# Patient Record
Sex: FEMALE | Race: WHITE | Employment: FULL TIME | URBAN - METROPOLITAN AREA
[De-identification: names, ages, dates, MRNs, and addresses within clinical notes are randomized per-mention and may not be internally consistent; named-entity substitution may affect disease eponyms.]

---

## 2017-09-05 ENCOUNTER — HOSPITAL ENCOUNTER (OUTPATIENT)
Dept: PHYSICAL THERAPY | Age: 32
Discharge: HOME | End: 2017-09-05
Payer: COMMERCIAL

## 2017-09-05 PROCEDURE — 97110 THERAPEUTIC EXERCISES: CPT

## 2017-09-05 PROCEDURE — 97161 PT EVAL LOW COMPLEX 20 MIN: CPT

## 2017-09-05 NOTE — PROGRESS NOTES
[address] [facility] AT [address], [address] [place] [address]  Phone: [phone]   [phone]  PLAN OF CARE / [address] [place] [address] PHYSICAL THERAPY SERVICES  Patient Name: Dominic Craig : 1985   Medical   Diagnosis: Chest pain [R07.9] Treatment Diagnosis: R07.9   Onset Date: 2017     Referral Source: Fiona Shape Start of Care [facility]): 2017   Prior Hospitalization: See medical history Provider #: [id number]   Prior Level of Function: R hand dominant, independent w/ ADL's   Comorbidities: NA   Medications: Verified on Patient Summary List   The Plan of Care and following information is based on the information from the initial evaluation.   ===========================================================================================  Assessment / key information:  Patient is a 29 y/o female presenting with chief c/o right sided chest discomfort several months ago, beginning as pressure in the chest (possibly related to working out). Symptoms would often be relieved after popping. Symptoms escalated 2-3 weeks ago with no known cause. Patient reports pain in the mid sternal chris of the chest right of center. Symptoms are more noticeable with stretching right shoulder back, sleeping on involved side. Symptoms range from 0-8/10. Patient is R hand dominant. 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 Patient presents with moderate forward head position and flattened thoracic kyphosis. Spine and shoulder AROM are WFL, but mid thoracic and sternal tightness are noted with end range cervical flexion and end range shoulder flexion/abduction. Pt is TTP along the R sternal intercostal spaces 4-6, pec major, mid posterior thoracic facets and rhomboid muscle. Increased tension/discomfort noted with full inhalation/exhalation as well.  The patient was instructed in a home exercise program to address the above findings/deficits. The patient should benefit from therapy services to progress toward functional goals and improve quality of life.  ===========================================================================================  History LOW Complexity : Zero comorbidities / personal factors that will impact the outcome / POC; Examination MEDIUM Complexity : 3 Standardized tests and measures addressing body structure, function, activity limitation and / or participation in recreation ; Presentation MEDIUM Complexity : Evolving with changing characteristics ; Decision Making MEDIUM Complexity : FOTO score of 26-74; Complexity LOW   Problem List: pain affecting function, decrease strength, decrease ADL/ functional abilitiies, decrease activity tolerance and decrease flexibility/ joint mobility   Treatment Plan may include any combination of the following: Therapeutic exercise, Therapeutic activities, Physical agent/modality, Manual therapy and Patient education  Patient / Family readiness to learn indicated by: asking questions, trying to perform skills and interest  Persons(s) to be included in education: patient (P)  Barriers to Learning/Limitations: None  Measures taken:    Patient Goal (s): Make pain go away   Patient self reported health status: good  Rehabilitation Potential: good   Short Term Goals: To be accomplished in  4-6  treatments: Independent/compliant with long term HEP for thoracic mobility and posture  Patient will be educated on sitting posture modification to reduce musculoskeletal strain with sitting ADL's  Demonstrate minimal to no difference in right versus left pec mobility in doorway position   Long Term Goals:  To be accomplished in  8-12  treatments:  Patient will report 50% functional improvement with lifting and reaching ADL's involving right arm  Improve FOTO outcome score by 15% to indicate a significant improvement in ADL function  Frequency / Duration:   Patient to be seen  2  times per week for 4-6  weeks:  Patient / Caregiver education and instruction: activity modification and exercises  G-Codes (GP): NA  Therapist Signature: Melodie Hernandez PT, OCS Date: 5/3/6701   Certification Period: NA Time: 1:54 PM   ===========================================================================================  I certify that the above Physical Therapy Services are being furnished while the patient is under my care. I agree with the treatment plan and certify that this therapy is necessary. Physician Signature:        Date:       Time:     Please sign and return to [facility] or you may fax the signed copy to [phone]. Thank you.

## 2017-09-05 NOTE — PROGRESS NOTES
SHOULDER EVALUATION/ DAILY TREATMENT NOTE    Patient Name: Sarah Haile  Date:2017  : 1985  [x]  Patient  Verified  Payor:  / Plan: [facility] [place]note 2[place]Winston Monroy DEPENDENTS / Product Type: Laureano Finch /    In time:1:00  Out time:1:46  Total Treatment Time (min): 46  Total Timed Codes (min): 46  Visit #: 1      Treatment Area: Chest pain [R07.9]    SUBJECTIVE HISTORY:   Patient is a 29 y/o female presenting with chief c/o right sided chest discomfort several months ago, beginning as pressure in the chest (possibly related to working out). Symptoms would often be relieved after popping. Symptoms escalated 2-3 weeks ago with no known cause. Patient reports pain in the mid sternal chris of the chest right of center. Symptoms are more noticeable with stretching right shoulder back, sleeping on involved side. Symptoms range from 0-8/10. Patient is R hand dominant. 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    Pain Level (0-10 scale): 0    Posture: Mild fwd head posture, flattened T/S kyphosis    ROM:  [] Unable to assess at this time                                           AROM                                                              PROM   Left Right  Left Right   Flexion  WNL Flexion     Extension   Extension     Abduction   Abduction     ER @ 0 Degrees   ER @ 0 Degrees     ER @ 90 Degrees   ER @ 90 Degrees     IR @ 90 Degrees   IR @ 90 Degrees     Cervical mobility: Flexion 90%- thoracic restriction  Mild restriction w/ BL combined thoracic flexion/rotation    End Feel / Painful Arc:    Strength:   [] Unable to assess at this time                                                                            L (1-5) R (1-5) Pain   Flexors 4+ 4+ [] Yes   [x] No   Abductors   [] Yes   [] No   External Rotators   [] Yes   [] No   Internal Rotators   [] Yes   [] No   Supraspinatus   [] Yes   [] No   Serratus Anterior 4 4- [] Yes   [] No   Lower Trapezius   [] Yes   [] No   Elbow Flexion   [] Yes   [] No   Elbow Extension   [] Yes   [] No   Pec activation w/ resistance/overpressure painful on R     Scapulohumoral Control / Rhythm:  Able to eccentrically lower with good control?  Left: [] Yes   [] No     Right: [] Yes   [] No  Sub scapular clicking/crepitus palpable with active scapular protraction/retraction    Palpation:  [] Min  [x] Mod  [] Severe    Location:R anterior intercostal space 4-6, pec major  [] Min  [x] Mod  [] Severe    Location:Posterior R T4-5 facets, rhomboid   Min  [] Mod  [] Severe    Location:    Special Tests:  Adson's Test  [] Pos   [] Neg Yergason's Test [] Pos   [] Neg  Jacki's Test  [] Pos   [] Neg ER Lag Sign     [] Pos   [] Neg  Neer's Test  [] Pos   [] Neg IR Lift Off Sign  [] Pos   [] Neg  Hawkin's Test  [] Pos   [] Neg AC Joint  [] Pos   [] Neg  Speed's Test  [] Pos   [] Neg SC Joint  [] Pos   [] Neg  Empty Can  [] Pos   [] Neg Pectoral Tightness [x] Pos   [] Neg  Anterior Apprehension [] Pos   [] Neg   Posterior Apprehension [] Pos   [] Neg  Other:     OBJECTIVE TREATMENT:  Modality (rationale):   []  E-Stim: type _ x _ min     []att   []unatt   []w/US   []w/ice   []w/heat  []  Ultrasound: []cont   []pulse    _ W/cm2 x _  min   []1MHz   []3MHz  []  Iontophoresis: []take home patch w/ dexamethazone    []40mA   []80mA                               []_ mA min w/: []dexamethazone   []other:_  []  Ice pack _  min     [] Hot pack _  min     [] Paraffin _  min  []  Other:     Therapeutic Exercise: [x] see flow sheet     [] Other:_  Added/Changed Exercises:  [x]  Added:_See HEP  to improve (function): Thoracic and rib mobility, posture  []  Changed:_ to improve (function):  (minutes:12 )    Other Objective/Functional Measures:   Pain Level (0-10 scale) post treatment: 0    ASSESSMENT  [x]  See Plan of Care  Patient is assigned a low evaluation complexity based upon presence of no medical comorbidities, FOTO score, and changing symptom characteristics.     PLAN  See Alena Huang [place] 9/5/2017  1:02 PM

## 2017-09-07 ENCOUNTER — HOSPITAL ENCOUNTER (OUTPATIENT)
Dept: PHYSICAL THERAPY | Age: 32
Discharge: HOME | End: 2017-09-07
Payer: COMMERCIAL

## 2017-09-07 PROCEDURE — 97140 MANUAL THERAPY 1/> REGIONS: CPT

## 2017-09-07 PROCEDURE — 97110 THERAPEUTIC EXERCISES: CPT

## 2017-09-07 NOTE — PROGRESS NOTES
PT DAILY TREATMENT NOTE 8-    Patient Name: Emmanuelle Perkins  Date:2017  : 1985  [x]  Patient  Verified  Payor: BLUE CROSS / Plan: [facility] [place] / Product Type: PPO /    In time:100  Out time:155  Total Treatment Time (min): 55  Visit #: 2 of 12    Treatment Area: Chest pain [R07.9]    SUBJECTIVE  Pain Level (0-10 scale): 0  Any medication changes, allergies to medications, adverse drug reactions, diagnosis change, or new procedure performed?: [x] No    [] Yes (see summary sheet for update)  Subjective functional status/changes:   [] No changes reported  \"I was a little sore after last visit from the exam but just soreness\"    OBJECTIVE    45 min Therapeutic Exercise:  [x] See flow sheet :   Rationale: increase ROM and increase strength to improve the patients ability to perform functional ADL's      10 min Manual Therapy:    Technique:       Mid T/S costovertebral mobs, STM R rhomboid, R Pec Release   Rationale: decrease pain and increase tissue extensibility to improve functional ADL's            X min Patient Education: [x] Review HEP    [] Progressed/Changed HEP based on:   [] positioning   [] body mechanics   [] transfers   [] heat/ice application        Other Objective/Functional Measures: Initiated therex today per flow sheet, requiring vc and demo 100% of the time for proper form and technique. Pain Level (0-10 scale) post treatment: 0    ASSESSMENT/Changes in Function: Moderate tightness noted on the R rhomboid and T/S compared to the L. Moderate difficulty with posture for the tband ER today. Will continue to progress therex within current POC as patient is able.       Patient will continue to benefit from skilled PT services to modify and progress therapeutic interventions, address functional mobility deficits, address ROM deficits, address strength deficits, analyze and address soft tissue restrictions, analyze and cue movement patterns, analyze and modify body mechanics/ergonomics and assess and modify postural abnormalities to attain remaining goals. [x]  See Plan of Care  []  See progress note/recertification  []  See Discharge Summary         Progress towards goals / Updated goals:  All goals reviewed and progressing appropriately as of 9/7/2017     PLAN  [x]  Upgrade activities as tolerated     [x]  Continue plan of care  []  Update interventions per flow sheet       []  Discharge due to:_  []  Other:_      Linus Duncan PT 9/7/2017  7:05 AM    Future Appointments  Date Time Provider Johnathan Hogan   9/7/2017 1:00 PM Kj Manuel, PT [facility]   9/12/2017 3:00 PM Kj Manuel, JOSE Machado [facility]   9/14/2017 2:30 PM Trish Coppola [facility]   9/19/2017 3:00 PM Kj Manuel PT [facility]   9/21/2017 8:00 AM Alvina Culver PTA [facility]   9/26/2017 3:00 PM Kj Manuel PT [facility]   9/28/2017 3:00 PM Trish Coppola [facility]

## 2017-09-12 ENCOUNTER — APPOINTMENT (OUTPATIENT)
Dept: PHYSICAL THERAPY | Age: 32
End: 2017-09-12
Payer: COMMERCIAL

## 2017-09-14 ENCOUNTER — HOSPITAL ENCOUNTER (OUTPATIENT)
Dept: PHYSICAL THERAPY | Age: 32
Discharge: HOME | End: 2017-09-14
Payer: COMMERCIAL

## 2017-09-14 PROCEDURE — 97140 MANUAL THERAPY 1/> REGIONS: CPT

## 2017-09-14 PROCEDURE — 97110 THERAPEUTIC EXERCISES: CPT

## 2017-09-14 NOTE — PROGRESS NOTES
PT DAILY TREATMENT NOTE     Patient Name: Maurizio Lopez  Date:2017  : 1985  [x]  Patient  Verified  Payor: Nyasia Jason / Plan: [address] [facility] [place] / Product Type: PPO /    In time:2:30  Out time:3:35  Total Treatment Time (min): 65  Total Timed Codes (min): 55  1:1 Treatment Time (min):    Visit #: 3 of 12    Treatment Area: Chest pain [R07.9]    SUBJECTIVE  Pain Level (0-10 scale): 0  Any medication changes, allergies to medications, adverse drug reactions, diagnosis change, or new procedure performed?: [x] No    [] Yes (see summary sheet for update)  Subjective functional status/changes:   [] No changes reported  I didn't get to do as much of my HEP as I should have because I was finishing my last week of school which took up a lot of my time. I have no pain right now, but did earlier because I went on a 10 mile bike ride with my boyfriend and I think leaning on the handlebars added some stress.      OBJECTIVE  Modality rationale: decrease pain and increase tissue extensibility to improve the patients ability to change upper limb position for lifting, reaching and dressing tasks   Min Type Additional Details    [] Estim: []Att   []Unatt        []TENS instruct                  []IFC  []Premod   []NMES                     []Other:  []w/US   []w/ice   []w/heat  Position:  Location:    []  Traction: [] Cervical       []Lumbar                       [] Prone          []Supine                       []Intermittent   []Continuous Lbs:  [] before manual  [] after manual    []  Ultrasound: []Continuous   [] Pulsed                           []1MHz   []3MHz Location:  W/cm2:    []  Iontophoresis with dexamethasone         Location: [] Take home patch   [] In clinic   10 []  Ice     [x]  heat  []  Ice massage Position:  Location: R cervical and pec    []  Vasopneumatic Device Pressure:       [] lo [] med [] hi   Temperature: [] lo [] med [] hi   [] Skin assessment post-treatment:  []intact []redness- no adverse reaction       []redness  adverse reaction:       45 min Therapeutic Exercise:  [] See flow sheet :   Rationale: increase ROM and increase strength to improve the patients ability to change upper limb position for lifting, reaching and dressing tasks    10 min Manual Therapy:  STM to R rhomboids, MFR to R pec   Rationale: increase tissue extensibility to improve pain free shoulder and thoracic mobility    Pain Level (0-10 scale) post treatment: 2-3    ASSESSMENT/Changes in Function: Moderate soft tissue and joint mobility restrictions were present at right mid thoracic spine and costovertebral segments. Patient will continue to benefit from skilled PT services to analyze and address soft tissue restrictions, analyze and cue movement patterns and assess and modify postural abnormalities to attain remaining goals.      []  See Plan of Care  []  See progress note/recertification  []  See Discharge Summary         Progress towards goals / Updated goals:  GOAL: Patient will be educated on sitting posture modification to reduce musculoskeletal strain with sitting ADL's- Met    PLAN  []  Upgrade activities as tolerated     [x]  Continue plan of care  []  Update interventions per flow sheet       []  Discharge due to:_  []  Other:_      Velvet Grey, PT 9/14/2017  2:36 PM

## 2017-09-19 ENCOUNTER — HOSPITAL ENCOUNTER (OUTPATIENT)
Dept: PHYSICAL THERAPY | Age: 32
Discharge: HOME | End: 2017-09-19
Payer: COMMERCIAL

## 2017-09-19 PROCEDURE — 97110 THERAPEUTIC EXERCISES: CPT

## 2017-09-19 PROCEDURE — 97140 MANUAL THERAPY 1/> REGIONS: CPT

## 2017-09-19 NOTE — PROGRESS NOTES
PT DAILY TREATMENT NOTE     Patient Name: Jay Olmos  Date:2017  : 1985  [x]  Patient  Verified  Payor: BLUE CROSS / Plan: [address] [place] [address] [place] / Product Type: PPO /    In time:300  Out time:350  Total Treatment Time (min): 50  Visit #: 4 of 12    Treatment Area: Chest pain [R07.9]    SUBJECTIVE  Pain Level (0-10 scale): 0  Any medication changes, allergies to medications, adverse drug reactions, diagnosis change, or new procedure performed?: [x] No    [] Yes (see summary sheet for update)  Subjective functional status/changes:   [] No changes reported  \"It comes and goes, and I did a walk this week and it seemed to feel fine with no pain\"    OBJECTIVE  Modality rationale: decrease pain and increase tissue extensibility to improve the patients ability to change upper limb position for lifting, reaching and dressing tasks   Min Type Additional Details    [] Estim: []Att   []Unatt        []TENS instruct                  []IFC  []Premod   []NMES                     []Other:  []w/US   []w/ice   []w/heat  Position:  Location:    []  Traction: [] Cervical       []Lumbar                       [] Prone          []Supine                       []Intermittent   []Continuous Lbs:  [] before manual  [] after manual    []  Ultrasound: []Continuous   [] Pulsed                           []1MHz   []3MHz Location:  W/cm2:    []  Iontophoresis with dexamethasone         Location: [] Take home patch   [] In clinic   PD []  Ice     [x]  heat  []  Ice massage Position:  Location: R cervical and pec    []  Vasopneumatic Device Pressure:       [] lo [] med [] hi   Temperature: [] lo [] med [] hi   [] Skin assessment post-treatment:  []intact []redness- no adverse reaction       []redness  adverse reaction:       40 min Therapeutic Exercise:  [] See flow sheet :   Rationale: increase ROM and increase strength to improve the patients ability to change upper limb position for lifting, reaching and dressing tasks    10 min Manual Therapy:  STM to R rhomboids, MFR to R pec   Rationale: increase tissue extensibility to improve pain free shoulder and thoracic mobility    Pain Level (0-10 scale) post treatment: 0    ASSESSMENT/Changes in Function: Pt reported increaesd pain from the T/S mobs last visit and declined those today. Patient continues to have increased tightness in the BL rhomboids. Will continue to progress therex within current POC as patient is able. Patient will continue to benefit from skilled PT services to analyze and address soft tissue restrictions, analyze and cue movement patterns and assess and modify postural abnormalities to attain remaining goals.      []  See Plan of Care  []  See progress note/recertification  []  See Discharge Summary         Progress towards goals / Updated goals:      PLAN  []  Upgrade activities as tolerated     [x]  Continue plan of care  []  Update interventions per flow sheet       []  Discharge due to:_  []  Other:_      Clarisa Manuel, PT 9/19/2017

## 2017-09-21 ENCOUNTER — APPOINTMENT (OUTPATIENT)
Dept: PHYSICAL THERAPY | Age: 32
End: 2017-09-21
Payer: COMMERCIAL

## 2017-09-26 ENCOUNTER — HOSPITAL ENCOUNTER (OUTPATIENT)
Dept: PHYSICAL THERAPY | Age: 32
Discharge: HOME | End: 2017-09-26
Payer: COMMERCIAL

## 2017-09-26 PROCEDURE — 97110 THERAPEUTIC EXERCISES: CPT

## 2017-09-26 NOTE — PROGRESS NOTES
PT DAILY TREATMENT NOTE     Patient Name: Bishop Gastelum  Date:2017  : 1985  [x]  Patient  Verified  Payor: BLUE CROSS / Plan: [facility] / Product Type: PPO /    In time:256 Out time:332  Total Treatment Time (min): 36  Visit #: 5 of 12    Treatment Area: Chest pain [R07.9]    SUBJECTIVE  Pain Level (0-10 scale): 0  Any medication changes, allergies to medications, adverse drug reactions, diagnosis change, or new procedure performed?: [x] No    [] Yes (see summary sheet for update)  Subjective functional status/changes:   [] No changes reported  Pt denies any pain at this time.      OBJECTIVE  Modality rationale: decrease pain and increase tissue extensibility to improve the patients ability to change upper limb position for lifting, reaching and dressing tasks   Min Type Additional Details    [] Estim: []Att   []Unatt        []TENS instruct                  []IFC  []Premod   []NMES                     []Other:  []w/US   []w/ice   []w/heat  Position:  Location:    []  Traction: [] Cervical       []Lumbar                       [] Prone          []Supine                       []Intermittent   []Continuous Lbs:  [] before manual  [] after manual    []  Ultrasound: []Continuous   [] Pulsed                           []1MHz   []3MHz Location:  W/cm2:    []  Iontophoresis with dexamethasone         Location: [] Take home patch   [] In clinic   PD []  Ice     [x]  heat  []  Ice massage Position:  Location: R cervical and pec    []  Vasopneumatic Device Pressure:       [] lo [] med [] hi   Temperature: [] lo [] med [] hi   [] Skin assessment post-treatment:  []intact []redness- no adverse reaction       []redness  adverse reaction:       36 min Therapeutic Exercise:  [] See flow sheet :   Rationale: increase ROM and increase strength to improve the patients ability to change upper limb position for lifting, reaching and dressing tasks    ND min Manual Therapy:  STM to R rhomboids, MFR to R pec   Rationale: increase tissue extensibility to improve pain free shoulder and thoracic mobility    Pain Level (0-10 scale) post treatment: 0    ASSESSMENT/Changes in Function: Pt denies any pain since last several visits. Pt also denies pain except from mild pain in the morning upon waking, however pain decreases following stretches. Pt reports approx 100% overall improvement since beginning PT and has been able to return to walking and jogging for recreational exercise with no pain or difficulty. Pt wishes to be DC [place] secondary to improvement in symptoms over the last several weeks. Patient will continue to benefit from skilled PT services to analyze and address soft tissue restrictions, analyze and cue movement patterns and assess and modify postural abnormalities to attain remaining goals. []  See Plan of Care  []  See progress note/recertification  []  See Discharge Summary         Progress towards goals / Updated goals: · Short Term Goals: To be accomplished in  4-6  treatments: Independent/compliant with long term HEP for thoracic mobility and posture- goal met  Patient will be educated on sitting posture modification to reduce musculoskeletal strain with sitting ADL's- goal met  Demonstrate minimal to no difference in right versus left pec mobility in doorway position  · Long Term Goals:  To be accomplished in  8-12  treatments:  Patient will report 50% functional improvement with lifting and reaching ADL's involving right arm- goal met 100% reported  Improve FOTO outcome score by 15% to indicate a significant improvement in ADL function      PLAN  []  Upgrade activities as tolerated     [x]  Continue plan of care  []  Update interventions per flow sheet       []  Discharge due to:_  []  Other:_      Vince Clark, PT 9/26/2017

## 2017-09-28 ENCOUNTER — HOSPITAL ENCOUNTER (OUTPATIENT)
Dept: PHYSICAL THERAPY | Age: 32
Discharge: HOME | End: 2017-09-28
Payer: COMMERCIAL

## 2017-09-28 PROCEDURE — 97110 THERAPEUTIC EXERCISES: CPT

## 2017-09-28 NOTE — PROGRESS NOTES
[address] [facility] AT [address], [address] [place] [address] Road  Phone: [phone]   [phone]  DISCHARGE SUMMARY  Patient Name: Alexander Dyer : 1985   Treatment/Medical Diagnosis: Chest pain [R07.9]   Referral Source: Cristal Toddbeth*     Date of Initial Visit: 17 Attended Visits: 6 Missed Visits: 0     SUMMARY OF TREATMENT  Therapeutic exercise, manual therapy for pec and thoracic mobility, pain modalities, HEP  CURRENT STATUS  Patient reports \"near full\" improvement after attending 6 therapy sessions for costochondritis. We have focused primarily on thoracic and pectoral mobility and posture, with associated manual techniques and modalities as needed. She reports 0/10 pain and no significant functional deficits, including ability to resume recreational walking and jogging. All therapy goals have been met and we are discharging at this time following review and update of home exercise program.  · Short Term Goals: To be accomplished in  4-6  treatments: Independent/compliant with long term HEP for thoracic mobility and posture- Met  Patient will be educated on sitting posture modification to reduce musculoskeletal strain with sitting ADL's- Met  Demonstrate minimal to no difference in right versus left pec mobility in doorway position- Met  · Long Term Goals: To be accomplished in  8-12  treatments:  Patient will report 50% functional improvement with lifting and reaching ADL's involving right arm- Met, pt reports 100%  Improve FOTO outcome score by 15% to indicate a significant improvement in ADL function- Met, improved from 52/100 to 97/100   RECOMMENDATIONS  Discontinue therapy. Progressing towards or have reached established goals. If you have any questions/comments please contact us directly at [phone]. Thank you for allowing us to assist in the care of your patient.     Therapist Signature: Emili Monzon PT, OCS Date: 9/28/17     Time: 11:00 AM

## 2017-09-28 NOTE — PROGRESS NOTES
PT DAILY TREATMENT NOTE     Patient Name: Yared Noriega  Date:2017  : 1985  [x]  Patient  Verified  Payor: [employer] / Plan: [facility] [place] / Product Type: PPO /    In time:3:05  Out time:3:48  Total Treatment Time (min): 43  Total Timed Codes (min): 43  1:1 Treatment Time (min):    Visit #: 6 of 12    Treatment Area: Chest pain [R07.9]    SUBJECTIVE  Pain Level (0-10 scale): 0  Any medication changes, allergies to medications, adverse drug reactions, diagnosis change, or new procedure performed?: [x] No    [] Yes (see summary sheet for update)  Subjective functional status/changes:   [] No changes reported  See Discharge Summary    OBJECTIVE  Modality rationale: Pt deferred   Min Type Additional Details    [] Estim: []Att   []Unatt        []TENS instruct                  []IFC  []Premod   []NMES                     []Other:  []w/US   []w/ice   []w/heat  Position:  Location:    []  Traction: [] Cervical       []Lumbar                       [] Prone          []Supine                       []Intermittent   []Continuous Lbs:  [] before manual  [] after manual    []  Ultrasound: []Continuous   [] Pulsed                           []1MHz   []3MHz Location:  W/cm2:    []  Iontophoresis with dexamethasone         Location: [] Take home patch   [] In clinic    []  Ice     []  heat  []  Ice massage Position:  Location:    []  Vasopneumatic Device Pressure:       [] lo [] med [] hi   Temperature: [] lo [] med [] hi   [] Skin assessment post-treatment:  []intact []redness- no adverse reaction       []redness  adverse reaction:       43 min Therapeutic Exercise:  [] See flow sheet :   Rationale: increase ROM and increase strength to improve the patients ability to change upper limb position for lifting, reaching and dressing tasks  Updated and reviewed long term HEP prior to discharge    Pain Level (0-10 scale) post treatment: 0    ASSESSMENT/Changes in Function:      []  See Plan of Care  []  See progress note/recertification  [x]  See Discharge Summary           PLAN  []  Upgrade activities as tolerated     []  Continue plan of care  []  Update interventions per flow sheet       [x]  Discharge due to:_Goals met  []  Other:_      Inez Mirza PT 9/28/2017  11:02 AM